# Patient Record
(demographics unavailable — no encounter records)

---

## 2024-12-13 NOTE — HISTORY OF PRESENT ILLNESS
[FreeTextEntry1] : Mrs. Kraus is a 74 year old female here for follow up.  She has a history of hypothyroidism and hyperlipidemia, and currently takes Synthroid and Crestor. She is status post colon resection on 9/8/2018.  During her hospital stay, she was found to have a systolic heart murmur. An echocardiogram demonstrated a hyperdynamic LV function, with a left ventricular outflow tract gradient of 60 mm of mercury. There was no systolic anterior motion of the mitral valve though there was mitral valve prolapse with moderate eccentric MR. She was started on Toprol-XL 25.  A repeat echocardiogram in 2018 demonstrated mild mitral regurgitation, normal LV systolic function, and a mild LVOT obstruction with a peak outflow tract gradient of 19, and mild diastolic dysfunction.  I last saw her 6/24.  On July 26, 2021, she presented to the emergency room with chest pain, and symptoms suggestive of an NSTEMI. She was transferred for left heart catheterization, which demonstrated nonobstructive CAD, though severe LV dysfunction in a pattern suggestive of a Takotsubo or stress-induced cardiomyopathy.   She was admitted to the intensive care unit with intermittent hypotension, and required Elan-Synephrine.  An echocardiogram demonstrated Tommie, mild to moderate mitral regurgitation, moderate segmental left ventricular systolic dysfunction, with an EF of 35 to 40%, with a moderate LVOT obstruction (42 mm hg at rest).  She also had a cardiac MRI which demonstrated an EF of 49%, with circumferential left ventricular hypokinesia extending from the mid left ventricle towards the apex, and no LGE.  She was admitted to the hospital briefly in April 2022 with nausea, vomiting, and dizziness.  She was diagnosed with bad vertigo, and is now status post vestibular therapy.  Her dizziness has resolved.  Imaging including a brain MRI were unremarkable.  She is doing better today and feeling well. She has no additional dizziness. Her SBP has been in the 130-140s at home. She has no chest pain, difficulty breathing, or palpitations.  Her main issue is related to her rash/eczema and itchiness. She is currently on Hydroxyzine. She had a biopsy 2 days ago, with the results pending.  Her last echocardiogram in 8/24 with EF >75%, with a basal intracavitary gradient of 19 mmhg, with a notable increase with Valsalva (up to 169 mm hg, though LVOT velocities are likely contaminated by the mitral regurgitation notable envelope) and moderate MR. Her beta-blocker was increased. Unique Flap 1 Name: Scalp rotation flap

## 2024-12-13 NOTE — PHYSICAL EXAM
[Well Developed] : well developed [Well Nourished] : well nourished [No Acute Distress] : no acute distress [Normal Conjunctiva] : normal conjunctiva [Normal Venous Pressure] : normal venous pressure [No Carotid Bruit] : no carotid bruit [Normal Rate] : normal [Normal S1] : normal S1 [Normal S2] : normal S2 [II] : a grade 2 [No Pitting Edema] : no pitting edema present [2+] : left 2+ [No Abnormalities] : the abdominal aorta was not enlarged and no bruit was heard [Clear Lung Fields] : clear lung fields [Good Air Entry] : good air entry [No Respiratory Distress] : no respiratory distress  [Soft] : abdomen soft [Non Tender] : non-tender [No Masses/organomegaly] : no masses/organomegaly [Normal Bowel Sounds] : normal bowel sounds [Normal Gait] : normal gait [No Edema] : no edema [No Cyanosis] : no cyanosis [No Clubbing] : no clubbing [No Varicosities] : no varicosities [No Rash] : no rash [No Skin Lesions] : no skin lesions [Moves all extremities] : moves all extremities [No Focal Deficits] : no focal deficits [Normal Speech] : normal speech [Alert and Oriented] : alert and oriented [Normal memory] : normal memory [S3] : no S3 [S4] : no S4 [Right Carotid Bruit] : no bruit heard over the right carotid [Left Carotid Bruit] : no bruit heard over the left carotid [Right Femoral Bruit] : no bruit heard over the right femoral artery [Left Femoral Bruit] : no bruit heard over the left femoral artery

## 2024-12-13 NOTE — DISCUSSION/SUMMARY
[With Me] : with me [___ Month(s)] : in [unfilled] month(s) [FreeTextEntry1] : In 2021, Yesy presented with chest pain and was diagnosed with a stress-induced cardiomyopathy.  Her cath demonstrated only mild nonobstructive CAD.   She is feeling well today other than her rash/itchy issues, but her BP remains mildly elevated. Her LV function has completely normalized on a repeat echocardiogram as of 2024. Her EKG, which had shown deep anterolateral T wave inversions, has also normalized. Her last echo did note an LVOT obstruction/moderate MR, though this value was likely contaminated/over estimated.  She will remain on Toprol XL 50 bid and losartan. She will stay hydrated. She will also stay on asa and atorvastatin given her non-obstructive CAD.   She will let me know when the derm studies come back. If no issues, I will see her again in 6 months. We will repeat an echocardiogram at this time.  [EKG obtained to assist in diagnosis and management of assessed problem(s)] : EKG obtained to assist in diagnosis and management of assessed problem(s)

## 2025-03-20 NOTE — ASSESSMENT
[FreeTextEntry1] : # Dermatitis BSA 40% Favor eczematous derm/hypersensitivity - outside bx: superficial perivascular dermatitis w eos c/w dermal hypersensitivity vs arthropod vs - patch testing: pos to iodopropynyl butarate, disperse red, shellac - bloodwork 8/2024 reviewed (CBC/CMP) - wnl PLAN: - will treat empirically w Permethrin - treat from neck down once tonigh, wash off in AM, repeat in 1 week, SED - start Mometasone 0.1% ointment to affected areas qOD, can alternate w Tacrolimus oint (has at home) Do not apply to face/groin. Side effects discussed with pt including but not limited to thinning of the skin, atrophy and dyspigmentation. - if no improvement, can consider phototherapy vs Dupixent, would prefer dupi, ordered today, SED, most common side effects associated with Dupixent are ocular SE (eye irritation and dryness), injection site reactions, hypersensitivity/hives, and rarely, herpes infections or hypereosinophilia.    #Xerosis/AD - Principles of dry skin care reviewed including: importance of using an emollient 2-3x/day, using gentle products, and avoiding fragranced products including soaps and detergent - TCS as above  RTC 1 mo

## 2025-03-20 NOTE — PHYSICAL EXAM
[Alert] : alert [FreeTextEntry3] : pink thin plaques and pink papules on arms and legs numerous excoriated pink papules on arms, legs > trunk L postauricular with thick pink plaque

## 2025-03-20 NOTE — HISTORY OF PRESENT ILLNESS
[FreeTextEntry1] : npa - rash [de-identified] : Pt is a 75 year old F presenting for initial eval of:  1. Itchy rash all over that started last Dec/January. Got better and then flared again this winter. - does not recall any new medications prior to rash onset. no one else at home itchy. no pets. No supplements or vitamins. Continues these meds throughout the spring/summer too when rash is better - was biopsied w [place] - superficial perivascular dermatitis w eos c/w dermal hypersensitivity vs arthropod vs - feels somewhat improved on TAC oint, Tacro oint and Hydroxyzine but still very itchy, cannot sleep through the night  - was patch tested: iodopropynyl butarate, disperse red, shellac  Meds: Levothyroxine, atorvastatin, losartan, metoprolol

## 2025-04-02 NOTE — HISTORY OF PRESENT ILLNESS
[FreeTextEntry1] : dupixent injection training [de-identified] : 75 year old. here for dupixent injection training. does not want to start dupixent today.

## 2025-04-02 NOTE — ASSESSMENT
[FreeTextEntry1] : atopic derm discussed dupixent patient defers injection today  would like to rediscuss with dr grijalva

## 2025-04-15 NOTE — HISTORY OF PRESENT ILLNESS
[FreeTextEntry1] : rpa - rash [de-identified] : Pt is a 75 year old F presenting for f/u rash, favored eczematous/hypersensitivity. Was improved few weeks ago so did not start dupi but past few days flaring so would like to start as below  Hx-  Itchy rash all over that started last Dec/January. Got better and then flared again this winter. - does not recall any new medications prior to rash onset. no one else at home itchy. no pets. No supplements or vitamins. Continues these meds throughout the spring/summer too when rash is better - was biopsied w [place] - superficial perivascular dermatitis w eos c/w dermal hypersensitivity vs arthropod vs - feels somewhat improved on TAC oint, Tacro oint and Hydroxyzine but still very itchy, cannot sleep through the night  - was patch tested: iodopropynyl butarate, disperse red, shellac  Meds: Levothyroxine, atorvastatin, losartan, metoprolol

## 2025-04-15 NOTE — ASSESSMENT
[FreeTextEntry1] : # Dermatitis BSA 40% Favor eczematous derm/hypersensitivity - outside bx: superficial perivascular dermatitis w eos c/w dermal hypersensitivity vs arthropod - patch testing: pos to iodopropynyl butarate, disperse red, shellac - bloodwork 8/2024 reviewed (CBC/CMP) - wnl - s/p empiric Permethrin  - c/w Mometasone 0.1% ointment to affected areas qOD, can alternate w Tacrolimus oint (has at home) Do not apply to face/groin. Side effects discussed with pt including but not limited to thinning of the skin, atrophy and dyspigmentation. -discussed consider phototherapy vs Dupixent, would prefer dupi, ordered today, SED, most common side effects associated with Dupixent are ocular SE (eye irritation and dryness), injection site reactions, hypersensitivity/hives, and rarely, herpes infections or hypereosinophilia. - injected dupi loading dose of 600mg today- 300mg into each thigh, tolerated well, instructed how to administer, all questions answered. Next dose in 2 weeks  Lot [id number], 8/31/26    #Xerosis/AD - Principles of dry skin care reviewed including: importance of using an emollient 2-3x/day, using gentle products, and avoiding fragranced products including soaps and detergent - TCS as above  RTC 3 months

## 2025-07-07 NOTE — DISCUSSION/SUMMARY
[With Me] : with me [___ Month(s)] : in [unfilled] month(s) [FreeTextEntry1] : In 2021, Yesy presented with chest pain and was diagnosed with a stress-induced cardiomyopathy.  Her cath demonstrated only mild nonobstructive CAD.   Overall, she is doing well.  Her LV function was hyperdynamic on last echocardiogram in 2025.  Her EKG, which had shown deep anterolateral T wave inversions, has also normalized. Her last echo did note an LVOT obstruction/moderate MR, confirmed on most recent echocardiogram.  She will remain on Toprol XL 50 bid (back up from 25 bid) and losartan. She will stay hydrated. She will also stay on asa and atorvastatin given her non-obstructive CAD.   I would like her to have a cardiac MRI to evaluate her LVOT obstruction and basal septal hypertrophy.  If no issues, I will see her again in 6 months. [EKG obtained to assist in diagnosis and management of assessed problem(s)] : EKG obtained to assist in diagnosis and management of assessed problem(s)

## 2025-07-07 NOTE — PHYSICAL EXAM
[Well Developed] : well developed [Well Nourished] : well nourished [No Acute Distress] : no acute distress [Normal Conjunctiva] : normal conjunctiva [Normal Venous Pressure] : normal venous pressure [No Carotid Bruit] : no carotid bruit [Normal Rate] : normal [Normal S1] : normal S1 [Normal S2] : normal S2 [S3] : no S3 [S4] : no S4 [II] : a grade 2 [No Pitting Edema] : no pitting edema present [Right Carotid Bruit] : no bruit heard over the right carotid [Left Carotid Bruit] : no bruit heard over the left carotid [Right Femoral Bruit] : no bruit heard over the right femoral artery [Left Femoral Bruit] : no bruit heard over the left femoral artery [2+] : left 2+ [No Abnormalities] : the abdominal aorta was not enlarged and no bruit was heard [Clear Lung Fields] : clear lung fields [Good Air Entry] : good air entry [No Respiratory Distress] : no respiratory distress  [Soft] : abdomen soft [Non Tender] : non-tender [No Masses/organomegaly] : no masses/organomegaly [Normal Bowel Sounds] : normal bowel sounds [Normal Gait] : normal gait [No Edema] : no edema [No Cyanosis] : no cyanosis [No Clubbing] : no clubbing [No Varicosities] : no varicosities [No Rash] : no rash [No Skin Lesions] : no skin lesions [Moves all extremities] : moves all extremities [No Focal Deficits] : no focal deficits [Normal Speech] : normal speech [Alert and Oriented] : alert and oriented [Normal memory] : normal memory

## 2025-07-07 NOTE — HISTORY OF PRESENT ILLNESS
[FreeTextEntry1] : Mrs. Kraus is a 74 year old female here for follow up.  She has a history of hypothyroidism and hyperlipidemia, and currently takes Synthroid and Crestor. She is status post colon resection on 9/8/2018.  During her hospital stay, she was found to have a systolic heart murmur. An echocardiogram demonstrated a hyperdynamic LV function, with a left ventricular outflow tract gradient of 60 mm of mercury. There was no systolic anterior motion of the mitral valve though there was mitral valve prolapse with moderate eccentric MR. She was started on Toprol-XL 25.  A repeat echocardiogram in 2018 demonstrated mild mitral regurgitation, normal LV systolic function, and a mild LVOT obstruction with a peak outflow tract gradient of 19, and mild diastolic dysfunction.  On July 26, 2021, she presented to the emergency room with chest pain, and symptoms suggestive of an NSTEMI. She was transferred for left heart catheterization, which demonstrated nonobstructive CAD, though severe LV dysfunction in a pattern suggestive of a Takotsubo or stress-induced cardiomyopathy.   She was admitted to the intensive care unit with intermittent hypotension, and required Elan-Synephrine.  An echocardiogram demonstrated Tommie, mild to moderate mitral regurgitation, moderate segmental left ventricular systolic dysfunction, with an EF of 35 to 40%, with a moderate LVOT obstruction (42 mm hg at rest).  She also had a cardiac MRI which demonstrated an EF of 49%, with circumferential left ventricular hypokinesia extending from the mid left ventricle towards the apex, and no LGE.  She was admitted to the hospital briefly in April 2022 with nausea, vomiting, and dizziness.  She was diagnosed with bad vertigo, and is now status post vestibular therapy.  Her dizziness has resolved.  Imaging including a brain MRI were unremarkable.  I last saw her 12/24.  She is doing better today and feeling well. She has no additional dizziness. Her SBP has been in the 130s at home. She has no chest pain, difficulty breathing, or palpitations.  Her main issue is related to her rash/eczema and itchiness, though this has improved with Dupixent.  Her last echocardiogram in 8/24 with EF >75%, with a basal intracavitary gradient of 19 mmhg, with a notable increase with Valsalva (up to 169 mm hg, though LVOT velocities are likely contaminated by the mitral regurgitation notable envelope) and moderate MR. Her beta-blocker was increased.

## 2025-07-17 NOTE — ASSESSMENT
[FreeTextEntry1] : # Dermatitis, chronic, stable on dupixent  Favor eczematous derm/hypersensitivity - outside bx: superficial perivascular dermatitis w eos c/w dermal hypersensitivity vs arthropod - patch testing: pos to iodopropynyl butarate, disperse red, shellac - bloodwork 8/2024 reviewed (CBC/CMP) - wnl - s/p empiric Permethrin  - c/w Mometasone 0.1% ointment to affected areas qOD, can alternate w Tacrolimus oint (has at home) Do not apply to face/groin. Side effects discussed with pt including but not limited to thinning of the skin, atrophy and dyspigmentation. -discussed SED, most common side effects associated with Dupixent are ocular SE (eye irritation and dryness), injection site reactions, hypersensitivity/hives, and rarely, herpes infections or hypereosinophilia. -c/w dupixent 300mg every 2 weeks   #Xerosis/AD - Principles of dry skin care reviewed including: importance of using an emollient 2-3x/day, using gentle products, and avoiding fragranced products including soaps and detergent - TCS as above  RTC 5 months

## 2025-07-17 NOTE — HISTORY OF PRESENT ILLNESS
[FreeTextEntry1] : rpa - rash [de-identified] : Pt is a 75 year old F presenting for f/u rash, favored eczematous/hypersensitivity.   Pt has been on Dupixent since 04/2025 with last dose being last Tuesday. Notes that the rash flares intermittently approximately 10 days after receiving dupixent. The rash lasts for 15 minutes then disappears- tolerable. Uses mometasone as needed. Also reports that her cardiologist increased her metoprolol from 50mg to 100mg, inquiring if that would affect dupi efficacy.   Hx-  Itchy rash all over that started last Dec/January. Got better and then flared again this winter. - does not recall any new medications prior to rash onset. no one else at home itchy. no pets. No supplements or vitamins. Continues these meds throughout the spring/summer too when rash is better - was biopsied w [place] - superficial perivascular dermatitis w eos c/w dermal hypersensitivity vs arthropod vs - feels somewhat improved on TAC oint, Tacro oint and Hydroxyzine but still very itchy, cannot sleep through the night  - was patch tested: iodopropynyl butarate, disperse red, shellac - now on dupi and intermittently on mometasone   Meds: Levothyroxine, atorvastatin, losartan, metoprolol